# Patient Record
Sex: FEMALE | Race: WHITE | Employment: STUDENT | URBAN - METROPOLITAN AREA
[De-identification: names, ages, dates, MRNs, and addresses within clinical notes are randomized per-mention and may not be internally consistent; named-entity substitution may affect disease eponyms.]

---

## 2018-05-02 ENCOUNTER — TRANSFERRED RECORDS (OUTPATIENT)
Dept: HEALTH INFORMATION MANAGEMENT | Facility: CLINIC | Age: 18
End: 2018-05-02

## 2018-05-08 ENCOUNTER — TRANSFERRED RECORDS (OUTPATIENT)
Dept: HEALTH INFORMATION MANAGEMENT | Facility: CLINIC | Age: 18
End: 2018-05-08

## 2018-06-13 ENCOUNTER — OFFICE VISIT (OUTPATIENT)
Dept: ORTHOPEDICS | Facility: CLINIC | Age: 18
End: 2018-06-13
Payer: COMMERCIAL

## 2018-06-13 VITALS
HEART RATE: 85 BPM | SYSTOLIC BLOOD PRESSURE: 112 MMHG | DIASTOLIC BLOOD PRESSURE: 70 MMHG | HEIGHT: 66 IN | OXYGEN SATURATION: 99 % | WEIGHT: 131.6 LBS | BODY MASS INDEX: 21.15 KG/M2

## 2018-06-13 DIAGNOSIS — G89.29 CHRONIC PAIN OF LEFT KNEE: Primary | ICD-10-CM

## 2018-06-13 DIAGNOSIS — M25.562 CHRONIC PAIN OF LEFT KNEE: Primary | ICD-10-CM

## 2018-06-13 PROCEDURE — 99203 OFFICE O/P NEW LOW 30 MIN: CPT | Performed by: FAMILY MEDICINE

## 2018-06-13 ASSESSMENT — PAIN SCALES - GENERAL: PAINLEVEL: MODERATE PAIN (4)

## 2018-06-13 NOTE — PROGRESS NOTES
CHIEF COMPLAINT:  Consult (Left knee pain X1 year. 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 )       HISTORY OF PRESENT ILLNESS  Ms. Morgan is a pleasant 18 year old year old female who presents to clinic today with left knee pain.  Mee is seen at the request of Klever Cam from [facility] in [place], [place].    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  She is on a local [employer] team.  She practices 4 days a week for about 2-4 hours a day.  She is also on the water during the weekends for a few hours each day.  She participates in one show a week.      She started to experience the pain in her left knee about a year ago with no clear event.  She points to the medial aspect of her knee both superior and inferior to the joint line.  She also has some pain laterally.  Pain is aching, dull at times, worse with skiing and worse with walking.  She denies any moses swelling, she has no numbness or tingling.  She does not recall any inciting events but did notice the pain after developing a new skill in which she is on her right ski only.  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      Additional history: as documented    MEDICAL HISTORY  There is no problem list on file for this patient.      No current outpatient prescriptions on file.       Allergies not on file    No family history on file.    Additional medical/Social/Surgical histories reviewed in [facility] and updated as appropriate.     REVIEW OF SYSTEMS (6/13/2018)  CONSTITUTIONAL: Denies fever and weight loss  EYES: Denies acute vision changes  ENT: Denies hearing changes or difficulty swallowing  CARDIAC: Denies chest pain or edema  RESPIRATORY: Denies dyspnea, cough or wheeze  GASTROINTESTINAL: Denies abdominal pain, nausea, vomiting  MUSCULOSKELETAL: See HPI  SKIN: Denies any recent rash or lesion  NEUROLOGICAL: Denies numbness or focal weakness  PSYCHIATRIC: No history  "of psychiatric symptoms or problems  ENDOCRINE: Denies current diagnosis of diabetes  HEMATOLOGY: Denies episodes of easy bleeding      PHYSICAL EXAM  Vitals:    06/13/18 1411   BP: 112/70   Pulse: 85   SpO2: 99%   Weight: 59.7 kg (131 lb 9.6 oz)   Height: 1.665 m (5' 5.55\")     General  - normal appearance, in no obvious distress  CV  - normal popliteal pulse  Pulm  - normal respiratory pattern, non-labored  Musculoskeletal - left knee  - stance: normal gait without limp  - inspection: no swelling or effusion, normal muscle tone, normal bone and joint alignment, no obvious deformity  - palpation: mildly tender over medial distal quad tendon, mildly tender medial and lateral patellar facets  - ROM: 135 degrees flexion, -5 degrees extension, trace pain at end range flexion  - strength: 5/5 in flexion, 5/5 in extension  - neuro: no sensory or motor deficit  - special tests:  (-) Lachman  (-) Devyn  (-) varus at 0 and 30 degrees flexion  (-) valgus at 0 and 30 degrees flexion  (-) compression test  (-) patellar apprehension  Neuro  - no sensory or motor deficit, grossly normal coordination, normal muscle tone  Skin  - no ecchymosis, erythema, warmth, or induration, no obvious rash  Psych  - interactive, appropriate, normal mood and affect           ASSESSMENT & PLAN  Ms. Morgan is a 18 year old year old female who presents to clinic today with left knee pain.    I reviewed her x-ray and clinic notes.  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    Given her level of activity and pain pattern I do suspect that she may have a long-standing quadricep tendinitis.  It is difficult to rule out a chondral or other intra-articular process.  Given her duration of pain and lack of improvement with conservative care I do think an MRI is warranted.    I will call her with the results.    Thank you for allowing me to participate in Mee's " care.    Ben Gee DO, CAQSM  Primary Care Sports Medicine

## 2018-06-13 NOTE — NURSING NOTE
"Mee Morgan's chief complaint for this visit includes:  Chief Complaint   Patient presents with     Consult     Left knee pain X1 year. 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      PCP: No Ref-Primary, Physician    Referring Provider:  No referring provider defined for this encounter.    /70  Pulse 85  Ht 1.665 m (5' 5.55\")  Wt 59.7 kg (131 lb 9.6 oz)  SpO2 99%  BMI 21.53 kg/m2  Moderate Pain (4)     Do you need any medication refills at today's visit? No        "

## 2018-06-13 NOTE — PATIENT INSTRUCTIONS
Thanks for coming today.  Ortho/Sports Medicine Clinic  [address] [place] [address]    To schedule future appointments in Ortho Clinic, you may call [phone].    To schedule ordered imaging by your provider:   Call Central Imaging [phone]    To schedule an injection ordered by your provider:  Call Central Imaging Injection scheduling line: [phone]  [employer] available online at:  [email or web address]    Please call if any further questions or concerns ([phone]).  Clinic hours 8 am to 5 pm.    Return to clinic (call) if symptoms worsen or fail to improve.

## 2018-06-13 NOTE — MR AVS SNAPSHOT
After Visit Summary   2018    Mee Morgan    MRN: [id number]           Patient Information     Date Of Birth          2000        Visit Information        Provider Department      2018 2:00 PM Ben Gee,  [facility]        Today's Diagnoses     Chronic pain of left knee    -  1      Care Instructions    Thanks for coming today.  Ortho/Sports Medicine Clinic  [address] [place] [address]    To schedule future appointments in Ortho Clinic, you may call [phone].    To schedule ordered imaging by your provider:   Call Central Imaging [phone]    To schedule an injection ordered by your provider:  Call Central Imaging Injection scheduling line: [phone]  [employer] available online at:  [email or web address]    Please call if any further questions or concerns ([phone]).  Clinic hours 8 am to 5 pm.    Return to clinic (call) if symptoms worsen or fail to improve.            Follow-ups after your visit        Future tests that were ordered for you today     Open Future Orders        Priority Expected Expires Ordered    MR Knee Left w/o Contrast Routine  2019            Who to contact     If you have questions or need follow up information about today's clinic visit or your schedule please contact [facility] [[email or web address]] directly at [phone].  Normal or non-critical lab and imaging results will be communicated to you by MyChart, letter or phone within 4 business days after the clinic has received the results. If you do not hear from us within 7 days, please contact the clinic through MyChart or phone. If you have a critical or abnormal lab result, we will notify you by phone as soon as possible.  Submit refill requests through [employer] or call your pharmacy and they will forward the refill request to us. Please allow 3 business days for your refill to be completed.          Additional  "Information About Your Visit        [employer] Information     [employer] is an electronic gateway that provides easy, online access to your medical records. With [employer], you can request a clinic appointment, read your test results, renew a prescription or communicate with your care team.     To sign up for [employer] visit the website at [email or web address]   You will be asked to enter the access code listed below, as well as some personal information. Please follow the directions to create your username and password.     Your access code is: [email or web address]  Expires: 2018  2:38 PM     Your access code will  in 90 days. If you need help or a new code, please contact your [facility] or call [phone] for assistance.        Care EveryWhere ID     This is your Care EveryWhere ID. This could be used by other organizations to access your [facility] medical records  [id number]        Your Vitals Were     Pulse Height Pulse Oximetry BMI (Body Mass Index)          85 1.665 m (5' 5.55\") 99% 21.53 kg/m2         Blood Pressure from Last 3 Encounters:   18 112/70    Weight from Last 3 Encounters:   18 59.7 kg (131 lb 9.6 oz)               Primary Care Provider Fax #    Physician No Ref-Primary [phone]       No address on file        Equal Access to Services     SINDY HARRIS : Hadii aad ku hadasho Soomaali, waaxda luqadaha, qaybta kaalmada adeegyada, jaqueline davalos . So [facility] [phone].    ATENCIÓN: Si habla español, tiene a fried disposición servicios gratuitos de asistencia lingüística. Llame al [phone].    We comply with applicable federal civil rights laws and [place] laws. We do not discriminate on the basis of race, color, national origin, age, disability, sex, sexual orientation, or gender identity.            Thank you!     Thank you for choosing [facility]  for your care. Our goal is always to provide you with " excellent care. Hearing back from our patients is one way we can continue to improve our services. Please take a few minutes to complete the written survey that you may receive in the mail after your visit with us. Thank you!             Your Updated Medication List - Protect others around you: Learn how to safely use, store and throw away your medicines at www.disposemymeds.org.      Notice  As of 6/13/2018  2:40 PM    You have not been prescribed any medications.

## 2018-06-13 NOTE — LETTER
6/13/2018         RE: Mee Morgan  [address] [address]        Dear Colleague,    Thank you for referring your patient, Mee Morgan, to the [facility]. Please see a copy of my visit note below.    CHIEF COMPLAINT:  Consult (Left knee pain X1 year. 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 )       HISTORY OF PRESENT ILLNESS  Ms. Morgan is a pleasant 18 year old year old female who presents to clinic today with left knee pain.  Mee is seen at the request of Klever Cam from [facility] in [place], [place].    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  She practices 4 days a week for about 2-4 hours a day.  She is also on the water during the weekends for a few hours each day.  She participates in one show a week.      She started to experience the pain in her left knee about a year ago with no clear event.  She points to the medial aspect of her knee both superior and inferior to the joint line.  She also has some pain laterally.  Pain is aching, dull at times, worse with skiing and worse with walking.  She denies any moses swelling, she has no numbness or tingling.  She does not recall any inciting events but did notice the pain after developing a new skill in which she is on her right ski only.  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      Additional history: as documented    MEDICAL HISTORY  There is no problem list on file for this patient.      No current outpatient prescriptions on file.       Allergies not on file    No family history on file.    Additional medical/Social/Surgical histories reviewed in [facility] and updated as appropriate.     REVIEW OF SYSTEMS (6/13/2018)  CONSTITUTIONAL: Denies fever and weight loss  EYES: Denies acute vision changes  ENT: Denies hearing changes or difficulty swallowing  CARDIAC: Denies chest pain or  "edema  RESPIRATORY: Denies dyspnea, cough or wheeze  GASTROINTESTINAL: Denies abdominal pain, nausea, vomiting  MUSCULOSKELETAL: See HPI  SKIN: Denies any recent rash or lesion  NEUROLOGICAL: Denies numbness or focal weakness  PSYCHIATRIC: No history of psychiatric symptoms or problems  ENDOCRINE: Denies current diagnosis of diabetes  HEMATOLOGY: Denies episodes of easy bleeding      PHYSICAL EXAM  Vitals:    06/13/18 1411   BP: 112/70   Pulse: 85   SpO2: 99%   Weight: 59.7 kg (131 lb 9.6 oz)   Height: 1.665 m (5' 5.55\")     General  - normal appearance, in no obvious distress  CV  - normal popliteal pulse  Pulm  - normal respiratory pattern, non-labored  Musculoskeletal - left knee  - stance: normal gait without limp  - inspection: no swelling or effusion, normal muscle tone, normal bone and joint alignment, no obvious deformity  - palpation: mildly tender over medial distal quad tendon, mildly tender medial and lateral patellar facets  - ROM: 135 degrees flexion, -5 degrees extension, trace pain at end range flexion  - strength: 5/5 in flexion, 5/5 in extension  - neuro: no sensory or motor deficit  - special tests:  (-) Lachman  (-) Devyn  (-) varus at 0 and 30 degrees flexion  (-) valgus at 0 and 30 degrees flexion  (-) compression test  (-) patellar apprehension  Neuro  - no sensory or motor deficit, grossly normal coordination, normal muscle tone  Skin  - no ecchymosis, erythema, warmth, or induration, no obvious rash  Psych  - interactive, appropriate, normal mood and affect           ASSESSMENT & PLAN  Ms. Morgan is a 18 year old year old female who presents to clinic today with left knee pain.    I reviewed her x-ray and clinic notes.  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    Given her level of activity and pain pattern I do suspect that she may have a long-standing quadricep tendinitis.  It is difficult to rule out a " chondral or other intra-articular process.  Given her duration of pain and lack of improvement with conservative care I do think an MRI is warranted.    I will call her with the results.    Thank you for allowing me to participate in Mee's care.    Ben Gee DO, [facility]  Primary Care Sports Medicine           Again, thank you for allowing me to participate in the care of your patient.        Sincerely,        Ben Gee DO

## 2018-06-19 ENCOUNTER — HEALTH MAINTENANCE LETTER (OUTPATIENT)
Age: 18
End: 2018-06-19

## 2018-08-01 ENCOUNTER — TELEPHONE (OUTPATIENT)
Dept: ORTHOPEDICS | Facility: CLINIC | Age: 18
End: 2018-08-01

## 2018-08-01 DIAGNOSIS — M25.562 LEFT KNEE PAIN: Primary | ICD-10-CM

## 2018-08-01 NOTE — TELEPHONE ENCOUNTER
[facility] Call Center    Phone Message    May a detailed message be left on voicemail: no    Reason for Call: Other: Blank is calling from [facility] regarding an MRI order that was sent over yesterday. States there were no demographics added to the order. Needs , address, Phone, and insruance information. She would also like a signed order sent. Also, would like an okay for the Radiologist to determin if contrast is needed on the new order. Please fax to [phone]. Please advise [phone]     Action Taken: Message routed to:  Adult Clinics: Sports Medicine p [phone]

## 2018-08-01 NOTE — TELEPHONE ENCOUNTER
Dr. Gee was okay with new order and it has been faxed over to [phone].  Alessandra Duarte MA.... 1:45 PM....8/1/2018

## 2018-08-03 ENCOUNTER — TRANSFERRED RECORDS (OUTPATIENT)
Dept: HEALTH INFORMATION MANAGEMENT | Facility: CLINIC | Age: 18
End: 2018-08-03

## 2020-03-11 ENCOUNTER — HEALTH MAINTENANCE LETTER (OUTPATIENT)
Age: 20
End: 2020-03-11

## 2021-01-03 ENCOUNTER — HEALTH MAINTENANCE LETTER (OUTPATIENT)
Age: 21
End: 2021-01-03

## 2021-04-25 ENCOUNTER — HEALTH MAINTENANCE LETTER (OUTPATIENT)
Age: 21
End: 2021-04-25

## 2021-10-10 ENCOUNTER — HEALTH MAINTENANCE LETTER (OUTPATIENT)
Age: 21
End: 2021-10-10

## 2022-05-21 ENCOUNTER — HEALTH MAINTENANCE LETTER (OUTPATIENT)
Age: 22
End: 2022-05-21

## 2022-09-18 ENCOUNTER — HEALTH MAINTENANCE LETTER (OUTPATIENT)
Age: 22
End: 2022-09-18

## 2023-06-04 ENCOUNTER — HEALTH MAINTENANCE LETTER (OUTPATIENT)
Age: 23
End: 2023-06-04